# Patient Record
Sex: FEMALE | Race: ASIAN | Employment: FULL TIME
[De-identification: names, ages, dates, MRNs, and addresses within clinical notes are randomized per-mention and may not be internally consistent; named-entity substitution may affect disease eponyms.]

---

## 2017-09-24 ENCOUNTER — HEALTH MAINTENANCE LETTER (OUTPATIENT)
Age: 57
End: 2017-09-24

## 2019-05-10 ENCOUNTER — MEDICAL CORRESPONDENCE (OUTPATIENT)
Dept: HEALTH INFORMATION MANAGEMENT | Facility: CLINIC | Age: 59
End: 2019-05-10

## 2019-05-10 ENCOUNTER — TRANSFERRED RECORDS (OUTPATIENT)
Dept: HEALTH INFORMATION MANAGEMENT | Facility: CLINIC | Age: 59
End: 2019-05-10

## 2019-05-14 ENCOUNTER — DOCUMENTATION ONLY (OUTPATIENT)
Dept: CARE COORDINATION | Facility: CLINIC | Age: 59
End: 2019-05-14

## 2019-05-29 ENCOUNTER — ANCILLARY PROCEDURE (OUTPATIENT)
Dept: ULTRASOUND IMAGING | Facility: CLINIC | Age: 59
End: 2019-05-29
Attending: NURSE PRACTITIONER
Payer: COMMERCIAL

## 2019-05-29 ENCOUNTER — ANCILLARY PROCEDURE (OUTPATIENT)
Dept: MAMMOGRAPHY | Facility: CLINIC | Age: 59
End: 2019-05-29
Attending: NURSE PRACTITIONER
Payer: COMMERCIAL

## 2019-05-29 DIAGNOSIS — R10.9 ABDOMINAL PAIN: ICD-10-CM

## 2019-05-29 DIAGNOSIS — Z12.31 VISIT FOR SCREENING MAMMOGRAM: ICD-10-CM

## 2019-06-06 ENCOUNTER — OFFICE VISIT (OUTPATIENT)
Dept: DERMATOLOGY | Facility: CLINIC | Age: 59
End: 2019-06-06
Payer: COMMERCIAL

## 2019-06-06 DIAGNOSIS — L72.0 ATHEROMA, SKIN: Primary | ICD-10-CM

## 2019-06-06 NOTE — LETTER
6/6/2019       RE: Alejandro Santos  [address] [facility] [address]     Dear Colleague,    Thank you for referring your patient, Alejandro Santos, to the [facility] DERMATOLOGY at [facility]. Please see a copy of my visit note below.    Chief Complaint   Patient presents with     Derm Problem     Cyst on scalp. She has had cyst for 20 years.         Jane Paz, RN      [facility] Dermatology Note      Dermatology Problem List:    Specialty Problems     None          CC:   Derm Problem (Cyst on scalp. She has had cyst for 20 years.)        Encounter Date: Jun 6, 2019    History of Present Illness:  Ms. Alejandro Santos is a 58 year old female who presents as a referral from [facility].    Past Medical History:   Patient Active Problem List   Diagnosis     Post hysterectomy menopause     Fever     Pelvic abscess in female     Past Medical History:   Diagnosis Date     Fibroid      NO ACTIVE PROBLEMS        Allergy History:     Allergies   Allergen Reactions     Penicillins Swelling and Rash     Shrimp Rash     Sulfa Drugs Rash       Social History:     reports that she has never smoked. She does not have any smokeless tobacco history on file. She reports that she does not drink alcohol or use drugs.      Family History:  Family History   Problem Relation Age of Onset     Hypertension Mother        Medications:  Current Outpatient Medications   Medication Sig Dispense Refill     docusate sodium (COLACE) 100 MG capsule Take 1 capsule by mouth 2 times daily. 30 capsule 0     Multiple Vitamins-Minerals (MULTIVITAMIN & MINERAL PO)              Review of Systems:  -As per HPI  -Constitutional: The patient denies fatigue, fevers, chills, unintended weight loss, and night sweats.  -HEENT: Patient denies nonhealing oral sores.  -Skin: As above in HPI. No additional skin concerns.    Physical exam:  Vitals: There were no vitals taken for this visit.  GEN: This is a well developed,  well-nourished female in no acute distress, in a pleasant mood.    SKIN: Focused examination of the scalp was performed.  On the scalp a about 1.5cm diameter cyst = Atheroma    -No other lesions of concern on areas examined.     Impression/Plan:    >> Atheroma on scalp (about 1.5cm)    ad excisionem        Follow-up in dermatosurgery    I spent a total of 10 minutes face to face with Alejandro Santos during today s office visit. Over 50% of this time was spent counseling the patient and/or coordinating care.  Please see Assessment and Plan for details.      Again, thank you for allowing me to participate in the care of your patient.      Sincerely,    Elliot Suarez MD

## 2019-06-06 NOTE — PROGRESS NOTES
[facility] Dermatology Note      Dermatology Problem List:    Specialty Problems     None          CC:   Derm Problem (Cyst on scalp. She has had cyst for 20 years.)        Encounter Date: Jun 6, 2019    History of Present Illness:  Ms. Alejandro Santos is a 58 year old female who presents as a referral from [facility].    Past Medical History:   Patient Active Problem List   Diagnosis     Post hysterectomy menopause     Fever     Pelvic abscess in female     Past Medical History:   Diagnosis Date     Fibroid      NO ACTIVE PROBLEMS        Allergy History:     Allergies   Allergen Reactions     Penicillins Swelling and Rash     Shrimp Rash     Sulfa Drugs Rash       Social History:     reports that she has never smoked. She does not have any smokeless tobacco history on file. She reports that she does not drink alcohol or use drugs.      Family History:  Family History   Problem Relation Age of Onset     Hypertension Mother        Medications:  Current Outpatient Medications   Medication Sig Dispense Refill     docusate sodium (COLACE) 100 MG capsule Take 1 capsule by mouth 2 times daily. 30 capsule 0     Multiple Vitamins-Minerals (MULTIVITAMIN & MINERAL PO)              Review of Systems:  -As per HPI  -Constitutional: The patient denies fatigue, fevers, chills, unintended weight loss, and night sweats.  -HEENT: Patient denies nonhealing oral sores.  -Skin: As above in HPI. No additional skin concerns.    Physical exam:  Vitals: There were no vitals taken for this visit.  GEN: This is a well developed, well-nourished female in no acute distress, in a pleasant mood.    SKIN: Focused examination of the scalp was performed.  On the scalp a about 1.5cm diameter cyst = Atheroma    -No other lesions of concern on areas examined.     Impression/Plan:    >> Atheroma on scalp (about 1.5cm)    ad excisionem        Follow-up in dermatosurgery    I spent a total of 10 minutes face to face with Alejandro Santos during today s  office visit. Over 50% of this time was spent counseling the patient and/or coordinating care.  Please see Assessment and Plan for details.

## 2019-06-06 NOTE — PROGRESS NOTES
Chief Complaint   Patient presents with     Derm Problem     Cyst on scalp. She has had cyst for 20 years.         Jane Paz RN

## 2019-07-16 ENCOUNTER — OFFICE VISIT (OUTPATIENT)
Dept: DERMATOLOGY | Facility: CLINIC | Age: 59
End: 2019-07-16
Payer: COMMERCIAL

## 2019-07-16 VITALS — HEART RATE: 94 BPM | DIASTOLIC BLOOD PRESSURE: 74 MMHG | SYSTOLIC BLOOD PRESSURE: 108 MMHG

## 2019-07-16 DIAGNOSIS — L72.9 SCALP CYST: Primary | ICD-10-CM

## 2019-07-16 ASSESSMENT — PAIN SCALES - GENERAL
PAINLEVEL: NO PAIN (0)
PAINLEVEL: NO PAIN (0)

## 2019-07-16 NOTE — NURSING NOTE
Chief Complaint   Patient presents with     Derm Problem     Patient is here today for an excision on scalp.     Zena Ross CMA

## 2019-07-16 NOTE — LETTER
7/16/2019       RE: Alejandro Santos  [address] [facility] [address]     Dear Colleague,    Thank you for referring your patient, Alejandro Santos, to the [facility] DERMATOLOGIC SURGERY at [facility]. Please see a copy of my visit note below.    DERMATOLOGY EXCISION PROCEDURE NOTE    Dermatology Problem List:  # Pilar cyst, vertex scalp, s/p excision 7/16/2019. Path pending.     NAME OF PROCEDURE: Excision intermediate layered linear closure  Staff surgeon: Josue Melvin MD  Fellow: Matt Traylor MD  Scrub Nurse: Zena Ross CMA    PRE-OPERATIVE DIAGNOSIS:  cyst  POST-OPERATIVE DIAGNOSIS: Same   LOCATION: Vertex scalp  FINAL EXCISION SIZE(DEFECT SIZE): 3.5 cm  MARGIN: 0 cm  FINAL REPAIR LENGTH: 3.5 cm   ANESTHESIA: 1% lidocaine with 1:100,000 epinephrine         INDICATIONS: This patient presented with a 3.5cm cyst. Excision was indicated. We discussed the principles of treatment and most likely complications including scarring, bleeding, infection, incomplete excision, wound dehiscence, pain, nerve damage, and recurrence. Informed consent was obtained and the patient underwent the procedure as follows:    PROCEDURE: The patient was taken to the operative suite. Time-out was performed.  The treatment area was anesthetized with 1% lidocaine with epinephrine. The area was prepped with Chlorhexidine and rinsed with sterile saline and draped with sterile towels. The lesion was delineated and incised to subcutaneous fat in a fusiform manner. Cyst wall was carefully dissected and removed in total.. Hemostasis was obtained by electrocoagulation.     REPAIR: An intermediate layered linear closure was selected as the procedure which would maximally preserve both function and cosmesis.    After the excision, hemostasis was obtained with  electrocoagulation.  Closure was oriented so that the wound was in the patient's natural skin tension lines. The subcutaneous and dermal layers were then closed with  4-0 vicryl sutures. The epidermis was then carefully approximated along the length of the wound using 5-0 Fast gut simple running sutures.     Estimated blood loss was less than 10 ml for all surgical sites. A sterile pressure dressing was applied and wound care instructions, with a written handout, were given. The patient was discharged from the [facility] Center alert and ambulatory.    Follow-up PRN  Dr. Josue Melvin MD was immediately available for the entire surgery and was physicially present for the key portions of the procedure.    Anatomic Pathology Results: pending        Photo placed into patients chart note for further reference.       Matt Traylor MD  PGY5 Dermatology  Mohs Fellow    Staff Involved:  Fellow/Staff       Attending attestation:  I was present for key elements of the procedure and immediately available for all other portions of the procedure.  I have reviewed the note and edited it as necessary.    Josue Melvin M.D.  Professor  Director of Dermatologic Surgery  Department of Dermatology  [facility]    Dermatology Surgery Clinic  [facility]  [address] [place] [id number]

## 2019-07-16 NOTE — PROGRESS NOTES
DERMATOLOGY EXCISION PROCEDURE NOTE    Dermatology Problem List:  # Pilar cyst, vertex scalp, s/p excision 7/16/2019. Path pending.     NAME OF PROCEDURE: Excision intermediate layered linear closure  Staff surgeon: Josue Melvin MD  Fellow: Matt Traylor MD  Scrub Nurse: Zena Ross CMA    PRE-OPERATIVE DIAGNOSIS:  cyst  POST-OPERATIVE DIAGNOSIS: Same   LOCATION: Vertex scalp  FINAL EXCISION SIZE(DEFECT SIZE): 3.5 cm  MARGIN: 0 cm  FINAL REPAIR LENGTH: 3.5 cm   ANESTHESIA: 1% lidocaine with 1:100,000 epinephrine         INDICATIONS: This patient presented with a 3.5cm cyst. Excision was indicated. We discussed the principles of treatment and most likely complications including scarring, bleeding, infection, incomplete excision, wound dehiscence, pain, nerve damage, and recurrence. Informed consent was obtained and the patient underwent the procedure as follows:    PROCEDURE: The patient was taken to the operative suite. Time-out was performed.  The treatment area was anesthetized with 1% lidocaine with epinephrine. The area was prepped with Chlorhexidine and rinsed with sterile saline and draped with sterile towels. The lesion was delineated and incised to subcutaneous fat in a fusiform manner. Cyst wall was carefully dissected and removed in total.. Hemostasis was obtained by electrocoagulation.     REPAIR: An intermediate layered linear closure was selected as the procedure which would maximally preserve both function and cosmesis.    After the excision, hemostasis was obtained with  electrocoagulation.  Closure was oriented so that the wound was in the patient's natural skin tension lines. The subcutaneous and dermal layers were then closed with 4-0 vicryl sutures. The epidermis was then carefully approximated along the length of the wound using 5-0 Fast gut simple running sutures.     Estimated blood loss was less than 10 ml for all surgical sites. A sterile pressure dressing was applied and wound care  instructions, with a written handout, were given. The patient was discharged from the [facility]atologic Surgery Center alert and ambulatory.    Follow-up PRN  Dr. Josue Melvin MD was immediately available for the entire surgery and was physicially present for the key portions of the procedure.    Anatomic Pathology Results: pending        Photo placed into patients chart note for further reference.       Matt Traylor MD  PGY5 Dermatology  Mohs Fellow    Staff Involved:  Fellow/Staff       Attending attestation:  I was present for key elements of the procedure and immediately available for all other portions of the procedure.  I have reviewed the note and edited it as necessary.    Josue Melvin M.D.  Professor  Director of Dermatologic Surgery  Department of Dermatology  [facility]    Dermatology Surgery Clinic  [facility] [place] [phone]

## 2019-07-16 NOTE — PATIENT INSTRUCTIONS
Excision Wound Care Instructions  I will experience scar, altered skin color, bleeding, swelling, pain, crusting and redness. I may experience altered sensation. Risks are excessive bleeding, infection, muscle weakness, thick (hypertrophic or keloidal) scar, and recurrence,. A second procedure may be recommended to obtain the best cosmetic or functional result.  Possible complications of any surgical procedure are bleeding, infection, scarring, alteration in skin color and sensation, muscle weakness in the area, wound dehiscence or seperation, or recurrence of the lesion or disease. On occasion, after healing, a secondary procedure or revision may be recommended in order to obtain the best cosmetic or functional result.   After your surgery, a pressure bandage will be placed over the area that has sutures. This will help prevent bleeding. Sutures will dissolve in one week, please continue wound care until wound is completely heal.   For the First 48 hours After Surgery:  1. Leave the pressure bandage on and keep it dry. If it should come loose, you may retape it, but do not take it off.  2. Relax and take it easy. Do not do any vigorous exercise, heavy lifting, or bending forward. This could cause the wound to bleed.  3. Post-operative pain is usually mild. You may take plain or extra strength Tylenol every 4 hours as needed (do not take more than 4,000mg in one day). Do not take any medicine that contains aspirin, ibuprofen or motrin unless you have been recommended these by a doctor.  Avoid alcohol and vitamin E as these may increase your tendency to bleed.  4. You may put an ice pack around the bandaged area for 20 minutes every 2-3 hours. This may help reduce swelling, bruising, and pain. Make sure the ice pack is waterproof so that the pressure bandage does not get wet.   5. You may see a small amount of drainage or blood on your pressure bandage. This is normal. However, if drainage or bleeding continues or  saturates the bandage, you will need to apply firm pressure over the bandage with a washcloth for 15 minutes. If bleeding continues after applying pressure for 15 minutes then go to the nearest emergency room.  48 Hours After Surgery  Carefully remove the bandage and start daily wound care and dressing changes. You may also now shower and get the wound wet. Wash wound with a mild soap and water.  Use caution when washing the wound. Be gentle and do not let the forceful shower stream hit the wound directly.  PAT dry.  Daily Wound Care:  1. Wash wound with a mild soap and water.  Use caution when washing the wound, be gentle and do not let the forceful shower stream hit the wound directly.  2. PAT DRY.  3. Apply Vaseline (from a new container or tube) over the suture line with a Q-tip. It is very important to keep the wound continuously moist, as wounds heal best in a moist environment.  4.  Keep the site covered for one week until stitches dissolve, you can cover it with a Telfa (non-stick) dressing and tape or a band-aid.    5. If you are unable to keep wound covered, you must apply Vaseline every 2 - 3 hours (while awake) to ensure it is being kept moist for optimal healing. A dressing overnight is recommended to keep the area moist.   Call Us If:  1. You have pain that is not controlled with Tylenol.  2. You have signs or symptoms of an infection, such as: fever over 100 degrees F, redness, warmth, or foul-smelling or yellow/creamy drainage from the wound.  Who should I call with questions?    [facility]: [phone]     [facility]: [phone]    For urgent needs outside of business hours call the [facility] at [phone] and ask for the dermatology resident on call

## 2019-07-17 ENCOUNTER — TELEPHONE (OUTPATIENT)
Dept: DERMATOLOGY | Facility: CLINIC | Age: 59
End: 2019-07-17

## 2019-07-17 NOTE — TELEPHONE ENCOUNTER
Follow up call attempted following procedure with , patient did not answer. I left a message stating that I was calling to see how they are doing following the procedure and to call us at [phone] if they have any concerns or questions.     Zena Ross CMA

## 2019-07-22 LAB — COPATH REPORT: NORMAL

## 2019-11-09 ENCOUNTER — HEALTH MAINTENANCE LETTER (OUTPATIENT)
Age: 59
End: 2019-11-09

## 2020-02-23 ENCOUNTER — HEALTH MAINTENANCE LETTER (OUTPATIENT)
Age: 60
End: 2020-02-23

## 2020-12-06 ENCOUNTER — HEALTH MAINTENANCE LETTER (OUTPATIENT)
Age: 60
End: 2020-12-06

## 2021-08-01 ENCOUNTER — HEALTH MAINTENANCE LETTER (OUTPATIENT)
Age: 61
End: 2021-08-01

## 2021-09-26 ENCOUNTER — HEALTH MAINTENANCE LETTER (OUTPATIENT)
Age: 61
End: 2021-09-26

## 2022-01-16 ENCOUNTER — HEALTH MAINTENANCE LETTER (OUTPATIENT)
Age: 62
End: 2022-01-16

## 2023-04-23 ENCOUNTER — HEALTH MAINTENANCE LETTER (OUTPATIENT)
Age: 63
End: 2023-04-23

## 2023-09-23 ENCOUNTER — HEALTH MAINTENANCE LETTER (OUTPATIENT)
Age: 63
End: 2023-09-23

## 2024-02-10 ENCOUNTER — HEALTH MAINTENANCE LETTER (OUTPATIENT)
Age: 64
End: 2024-02-10